# Patient Record
Sex: FEMALE | URBAN - METROPOLITAN AREA
[De-identification: names, ages, dates, MRNs, and addresses within clinical notes are randomized per-mention and may not be internally consistent; named-entity substitution may affect disease eponyms.]

---

## 2019-12-12 ENCOUNTER — APPOINTMENT (RX ONLY)
Dept: URBAN - METROPOLITAN AREA CLINIC 113 | Facility: CLINIC | Age: 19
Setting detail: DERMATOLOGY
End: 2019-12-12

## 2019-12-12 DIAGNOSIS — R21 RASH AND OTHER NONSPECIFIC SKIN ERUPTION: ICD-10-CM

## 2019-12-12 PROCEDURE — ? TREATMENT REGIMEN

## 2019-12-12 PROCEDURE — 99202 OFFICE O/P NEW SF 15 MIN: CPT

## 2019-12-12 PROCEDURE — ? PRESCRIPTION

## 2019-12-12 PROCEDURE — ? DIAGNOSIS COMMENT

## 2019-12-12 PROCEDURE — ? COUNSELING

## 2019-12-12 RX ORDER — PERMETHRIN 50 MG/G
5% CREAM TOPICAL
Qty: 1 | Refills: 1 | Status: ERX | COMMUNITY
Start: 2019-12-12

## 2019-12-12 RX ADMIN — PERMETHRIN 5%: 50 CREAM TOPICAL at 00:00

## 2019-12-12 NOTE — PROCEDURE: DIAGNOSIS COMMENT
Comment: Patient voiced concern for scabies and/or other bugs though no identifiable burrows. 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 Will treat empirically for scabies and follow up with regular gentle skin care.
Detail Level: Zone

## 2019-12-12 NOTE — PROCEDURE: TREATMENT REGIMEN
Detail Level: Zone
Plan: Discussed sanitizing bedding. \\nSuggested a biopsy at next office visit if not improved.
Initiate Treatment: Permethrin cream apply from the neck down at bedtime wearing long sleeves and pants, leave on for 8 hours and wash off in the morning. Repeat in 1 week.\\nIvermectin 3 mg tabs take 4 as one time dose.

## 2019-12-12 NOTE — HPI: RASH
What Type Of Note Output Would You Prefer (Optional)?: Bullet Format
Is The Patient Presenting As Previously Scheduled?: No, they are a work-in
How Severe Is Your Rash?: moderate
Is This A New Presentation, Or A Follow-Up?: Rash
Additional History: Previously diagnosed by 2 different primary care physicians at [facility] as eczema, which patient is not convinced about. She was prescribed a topical steroid which was not effective. Patient believes this was scabies which she received from her cat. She has been using coconut oil and tea tree oil with warm baths which have helped. She also notes seeing black bugs come out after the baths. She brings pictures in for review. \\nDenies h/o eczema or contact allergies which could have triggered this.

## 2020-01-16 ENCOUNTER — APPOINTMENT (RX ONLY)
Dept: URBAN - METROPOLITAN AREA CLINIC 113 | Facility: CLINIC | Age: 20
Setting detail: DERMATOLOGY
End: 2020-01-16

## 2020-01-16 DIAGNOSIS — B86 SCABIES: ICD-10-CM | Status: RESOLVED

## 2020-01-16 PROCEDURE — ? DIAGNOSIS COMMENT

## 2020-01-16 PROCEDURE — ? COUNSELING

## 2020-01-16 PROCEDURE — 99212 OFFICE O/P EST SF 10 MIN: CPT

## 2020-01-16 NOTE — PROCEDURE: DIAGNOSIS COMMENT
Detail Level: Zone
Comment: Complete clearance after course of ivermectin and permethrin. Discussed importance of treating family members who live with patient if they are exhibiting signs and/or symptoms.